# Patient Record
Sex: MALE | Race: AMERICAN INDIAN OR ALASKA NATIVE
[De-identification: names, ages, dates, MRNs, and addresses within clinical notes are randomized per-mention and may not be internally consistent; named-entity substitution may affect disease eponyms.]

---

## 2019-09-25 ENCOUNTER — HOSPITAL ENCOUNTER (EMERGENCY)
Dept: HOSPITAL 5 - ED | Age: 34
Discharge: HOME | End: 2019-09-25
Payer: COMMERCIAL

## 2019-09-25 VITALS — SYSTOLIC BLOOD PRESSURE: 134 MMHG | DIASTOLIC BLOOD PRESSURE: 72 MMHG

## 2019-09-25 DIAGNOSIS — F17.200: ICD-10-CM
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
## 2019-09-25 DIAGNOSIS — S93.602A: Primary | ICD-10-CM
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
## 2019-09-25 DIAGNOSIS — M21.42: ICD-10-CM

## 2019-09-25 DIAGNOSIS — Z79.899: ICD-10-CM
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
## 2019-09-25 NOTE — XRAY REPORT
Left foot 3 views



INDICATION: Left foot pain.



IMPRESSION: Prominent talar beaking with pes planus. This raises the possibility of tarsal coalition,
 chronic. No acute fracture or subluxation is identified.



Signer Name: Parker Fox MD 

Signed: 9/25/2019 8:42 PM

 Workstation Name: [id number]

## 2019-09-25 NOTE — EMERGENCY DEPARTMENT REPORT
ED Extremity Problem HPI





- General


Chief complaint: Extremity Injury, Lower


Stated complaint: LT ANKLE INJURY


Time Seen by Provider: 09/25/19 19:40


Source: patient


Mode of arrival: Ambulatory


Limitations: No Limitations





- History of Present Illness


Initial comments: 





Patient is a 33-year-old male who presents the emergency room with complaints of

left foot pain that began yesterday.  He states that he feels like he stepped 

down hard on something yesterday.  Denies any fall, laceration or abrasion, 

numbness or weakness.  Has been ambulatory without difficulty.  He denies ever 

injuring in the past.  Denies any past medical history or allergies medications.





- Related Data


                                  Previous Rx's











 Medication  Instructions  Recorded  Last Taken  Type


 


Naproxen [Naprosyn] 500 mg PO BID PRN #20 tablet 09/25/19 Unknown Rx











                                    Allergies











Allergy/AdvReac Type Severity Reaction Status Date / Time


 


No Known Allergies Allergy   Verified 06/28/15 01:01














ED Review of Systems


ROS: 


Stated complaint: LT ANKLE INJURY


Other details as noted in HPI





Comment: All other systems reviewed and negative





ED Past Medical Hx





- Past Medical History


Previous Medical History?: No





- Surgical History


Past Surgical History?: Yes


Additional Surgical History: Humerus fx repair





- Social History


Smoking Status: Current Every Day Smoker


Substance Use Type: None





- Medications


Home Medications: 


                                Home Medications











 Medication  Instructions  Recorded  Confirmed  Last Taken  Type


 


Naproxen [Naprosyn] 500 mg PO BID PRN #20 tablet 09/25/19  Unknown Rx














ED Physical Exam





- General


Limitations: No Limitations


General appearance: alert, in no apparent distress





- Head


Head exam: Present: atraumatic, normocephalic





- Eye


Eye exam: Present: normal appearance





- ENT


ENT exam: Present: mucous membranes moist





- Extremities Exam


Extremities exam: Present: other (mild TTP over the dorsal surface to the left 

foot over the medial tarsal bones, no obvious deformity, no obvious edema, no 

obvious ecchymosis, no TTP of the ankle, no TTP of the toes, FROM of the left 

foot, ankle, and toes without difficulty, 2+ dp and pt pulses, sensation intact)





- Neurological Exam


Neurological exam: Present: alert, oriented X3





- Psychiatric


Psychiatric exam: Present: normal affect, normal mood





- Skin


Skin exam: Present: warm, dry, intact





ED Course


                                   Vital Signs











  09/25/19





  19:36


 


Temperature 98.5 F


 


Pulse Rate 77


 


Respiratory 16





Rate 


 


Blood Pressure 134/72


 


O2 Sat by Pulse 98





Oximetry 














ED Medical Decision Making





- Radiology Data


Radiology results: report reviewed





Left foot 3 views





INDICATION: Left foot pain.





IMPRESSION: Prominent talar beaking with pes planus. This raises the possibility

 of tarsal


coalition, chronic. No acute fracture or subluxation is identified.





Signer Name: Parker Fox MD


Signed: 9/25/2019 8:42 PM


Workstation Name: [id number]








Transcribed By: BC


Dictated By: Parker Fox MD


Electronically Authenticated By: Parker Fox MD


Signed Date/Time: 09/25/19 2042





- Medical Decision Making





Patient is a 33-year-old male who presents the emergency room with complaints of

 left foot pain that began yesterday.  He states that he feels like he stepped 

down hard on something yesterday.  Denies any fall, laceration or abrasion, 

numbness or weakness.  Has been ambulatory without difficulty.  He denies ever 

injuring in the past.  Denies any past medical history or allergies medications.

 on exam: mild TTP over the dorsal surface to the left foot over the medial 

tarsal bones, no obvious deformity, no obvious edema, no obvious ecchymosis, no 

TTP of the ankle, no TTP of the toes, FROM of the left foot, ankle, and toes 

without difficulty, 2+ dp and pt pulses, sensation intact. XR of the left foot: 

Prominent talar beaking with pes planus. This raises the possibility of tarsal 

coalition, chronic. No acute fracture or subluxation is identified.  Discussed 

radiological results with patient and answer questions.  Patient given 

prescription for anti-inflammatory.  Advised patient to take medication as 

prescribed as needed.  May use rest, ice, elevation of the leg.  Follow-up with 

orthopedic doctor in the next 2-3 days.  Return to the emergency room for any 

new or worsening symptoms.





- Differential Diagnosis


strain, sprain, fx, dislocation, tendon/ligament injury 


Critical care attestation.: 


If time is entered above; I have spent that time in minutes in the direct care 

of this critically ill patient, excluding procedure time.








ED Disposition


Clinical Impression: 


 Left foot pain





Pes planus


Qualifiers:


 Laterality: bilateral Qualified Code(s): M21.41 - Flat foot [pes planus] 

(acquired), right foot





Sprain of left foot


Qualifiers:


 Encounter type: initial encounter Qualified Code(s): S93.602A - Unspecified 

sprain of left foot, initial encounter





Disposition: DC-01 TO HOME OR SELFCARE


Is pt being admited?: No


Does the pt Need Aspirin: No


Condition: Stable


Instructions:  Foot Sprain (ED), RICE Therapy (ED)


Additional Instructions: 


take medication as prescribed as needed.  May use rest, ice, elevation of the 

leg.  Follow-up with orthopedic doctor in the next 2-3 days.  Return to the 

emergency room for any new or worsening symptoms.


Prescriptions: 


Naproxen [Naprosyn] 500 mg PO BID PRN #20 tablet


 PRN Reason: pain


Referrals: 


LUKE BAKER MD [Staff Physician] - 2-3 Days


[facility] [Provider Group] - 2-3 Days


Forms:  Work/School Release Form(ED)


Time of Disposition: 21:23


Print Language: ENGLISH

## 2019-09-25 NOTE — EVENT NOTE
ED Screening Note


Date of service: 09/25/19


Time: 19:40


ED Screening Note: 


33 y o male presents with left foot pain s/p injury yesterday 


limping gait, no lac





This initial assessment/diagnostic orders/clinical plan/treatment(s) is/are 

subject to change based on patients health status, clinical progression and re-

assessment by fellow clinical providers in the ED. Further treatment and workup 

at subsequent clinical providers discretion. Patient/guardian urged not to elope

from the ED as their condition may be serious if not clinically assessed and 

managed. 





Initial orders include: 


xr foot